# Patient Record
Sex: MALE | Race: ASIAN | NOT HISPANIC OR LATINO | Employment: UNEMPLOYED | URBAN - METROPOLITAN AREA
[De-identification: names, ages, dates, MRNs, and addresses within clinical notes are randomized per-mention and may not be internally consistent; named-entity substitution may affect disease eponyms.]

---

## 2024-07-25 ENCOUNTER — OFFICE VISIT (OUTPATIENT)
Dept: FAMILY MEDICINE CLINIC | Facility: CLINIC | Age: 24
End: 2024-07-25
Payer: COMMERCIAL

## 2024-07-25 VITALS
HEART RATE: 65 BPM | OXYGEN SATURATION: 98 % | SYSTOLIC BLOOD PRESSURE: 132 MMHG | BODY MASS INDEX: 26.68 KG/M2 | RESPIRATION RATE: 16 BRPM | HEIGHT: 67 IN | WEIGHT: 170 LBS | DIASTOLIC BLOOD PRESSURE: 84 MMHG | TEMPERATURE: 97.1 F

## 2024-07-25 DIAGNOSIS — E55.9 VITAMIN D DEFICIENCY: ICD-10-CM

## 2024-07-25 DIAGNOSIS — R53.82 CHRONIC FATIGUE: ICD-10-CM

## 2024-07-25 DIAGNOSIS — Z83.3 FAMILY HISTORY OF DIABETES MELLITUS: ICD-10-CM

## 2024-07-25 DIAGNOSIS — G43.009 MIGRAINE WITHOUT AURA AND WITHOUT STATUS MIGRAINOSUS, NOT INTRACTABLE: Primary | ICD-10-CM

## 2024-07-25 PROCEDURE — 1159F MED LIST DOCD IN RCRD: CPT | Performed by: FAMILY MEDICINE

## 2024-07-25 PROCEDURE — 99204 OFFICE O/P NEW MOD 45 MIN: CPT | Performed by: FAMILY MEDICINE

## 2024-07-25 PROCEDURE — 1160F RVW MEDS BY RX/DR IN RCRD: CPT | Performed by: FAMILY MEDICINE

## 2024-07-25 RX ORDER — TOPIRAMATE 50 MG/1
50 TABLET, FILM COATED ORAL 2 TIMES DAILY
Qty: 60 TABLET | Refills: 2 | Status: SHIPPED | OUTPATIENT
Start: 2024-07-25

## 2024-07-25 RX ORDER — SUMATRIPTAN 50 MG/1
TABLET, FILM COATED ORAL
Qty: 9 TABLET | Refills: 2 | Status: SHIPPED | OUTPATIENT
Start: 2024-07-25

## 2024-07-25 NOTE — PROGRESS NOTES
Subjective     Vi Loco is a 24 y.o. male.     Chief Complaint   Patient presents with    Establish Care    Headache    Fatigue       History of Present Illness     To establish care, discuss the followings ;    He is c/o recurrent HA mainly on the Lt side , ~ 2 x /week, worse with light and sounds .   He stated in 2019 had bad HA to the point he lost consciousness  Smokes occasionally   Feels fatigue and tired   Vit D defi; not on suppl  +ve Fhx for DM     CT brain showed;   IMPRESSION:   1. Accounting for differences in technique and slice selection, there has been no significant interval change compared to the outside facility MRI brain from 6/3/2019. There is no noncontrast MR evidence of an acute intracranial abnormality.   2. The scattered foci of T2/FLAIR hyperintensity in the supratentorial white matter are entirely non-specific, but are not significantly changed compared to the prior outside facility exam. The differential diagnosis is broad, and can include sequela of   chronic migraines, gliosis from nonspecific prior injury, advanced chronic small vessel ischemic changes if there is a history of uncontrolled diabetes or hypertension, demyelinating process, vasculitis, Lyme disease in the appropriate setting, or other   nonspecific infectious/inflammatory process.        Labs and documentation from previous PCP / consulting physician has been reviewed          The following portions of the patient's history were reviewed and updated as appropriate: allergies, current medications, past family history, past medical history, past social history, past surgical history, and problem list.        Review of Systems   Neurological:  Positive for headache.       Vitals:    07/25/24 1057   BP: 132/84   Pulse: 65   Resp: 16   Temp: 97.1 °F (36.2 °C)   SpO2: 98%           07/25/24  1057   Weight: 77.1 kg (170 lb)         Body mass index is 26.63 kg/m².      Current Outpatient Medications   Medication Sig  Dispense Refill    SUMAtriptan (Imitrex) 50 MG tablet Take one tablet at onset of headache. May repeat dose one time in 2 hours if headache not relieved. 9 tablet 2    topiramate (Topamax) 50 MG tablet Take 1 tablet by mouth 2 (Two) Times a Day. 60 tablet 2     No current facility-administered medications for this visit.                Objective   Physical Exam  Vitals and nursing note reviewed.   Constitutional:       General: He is not in acute distress.     Appearance: He is not ill-appearing, toxic-appearing or diaphoretic.   Eyes:      Extraocular Movements: Extraocular movements intact.      Conjunctiva/sclera: Conjunctivae normal.      Pupils: Pupils are equal, round, and reactive to light.   Neck:      Comments: No enlarged thyroid      Cardiovascular:      Rate and Rhythm: Normal rate and regular rhythm.      Heart sounds: Normal heart sounds. No murmur heard.  Pulmonary:      Effort: Pulmonary effort is normal. No respiratory distress.      Breath sounds: Normal breath sounds. No stridor. No wheezing or rhonchi.   Musculoskeletal:      Cervical back: Neck supple.   Skin:     General: Skin is warm.      Coloration: Skin is not jaundiced or pale.   Neurological:      General: No focal deficit present.      Mental Status: He is alert and oriented to person, place, and time.      Gait: Gait normal.   Psychiatric:         Mood and Affect: Mood normal.         Behavior: Behavior normal.         Thought Content: Thought content normal.           Assessment & Plan   Diagnoses and all orders for this visit:    1. Migraine without aura and without status migrainosus, not intractable (Primary)  Comments:  most likley his sx 2/2 Migraine HA  close monitoring and HA login  will start on ;  Orders:  -     SUMAtriptan (Imitrex) 50 MG tablet; Take one tablet at onset of headache. May repeat dose one time in 2 hours if headache not relieved.  Dispense: 9 tablet; Refill: 2  -     topiramate (Topamax) 50 MG tablet; Take 1  tablet by mouth 2 (Two) Times a Day.  Dispense: 60 tablet; Refill: 2    2. Chronic fatigue  -     CBC (No Diff)  -     Lipid Panel  -     Comprehensive Metabolic Panel  -     Hemoglobin A1c  -     Vitamin B12  -     TSH Rfx On Abnormal To Free T4    3. Family history of diabetes mellitus  -     Hemoglobin A1c    4. Vitamin D deficiency  -     Vitamin D,25-Hydroxy          I spent 48 minutes caring for this patient on this date of service. This time includes time spent by me in the following activities:preparing for the visit,reviewing previous medical records,  performing a medically appropriate examination and/or evaluation, counseling and educating the patient/family/caregiver, and documenting information in the medical record.     Patient was given instructions and counseling regarding her condition or for health maintenance advice. Please see specific information pulled into the AVS if appropriate.         I have fully discussed the nature of the medical condition(s) risks, complications, management, safe and proper use of medications.   Pt stated no allergy to the above prescribed medication.  I have discussed the SIDE EFFECT OF MEDICATION and importance TO report any side effect , the patient expressed good understanding.  Encouraged medication compliance and the importance of keeping scheduled follow up appointments with me and any other providers.    Patient instructed to follow up with our office for results on any labs/imaging ordered during this visit.    Home care discussed  All questions answered  Patient verbalizes understanding and agrees to treatment plan.     Follow up: Return in about 4 weeks (around 8/22/2024) for physical.      Addendum;  Labs has been ordered and personally/independently interpreted , discussed with pt     All ok except for low vit B12 AND VIT D insuff  Will start on weekly vit d   Advised for b12 shots vs oral b12   .  Lab Results   Component Value Date    GLUCOSE 84 07/25/2024     BUN 8 07/25/2024    CREATININE 0.79 07/25/2024    EGFRRESULT 128 07/25/2024    BCR 10 07/25/2024    K 4.2 07/25/2024    CO2 23 07/25/2024    CALCIUM 10.1 07/25/2024    PROTENTOTREF 7.7 07/25/2024    ALBUMIN 4.9 07/25/2024    BILITOT 0.5 07/25/2024    AST 16 07/25/2024    ALT 14 07/25/2024     Lab Results   Component Value Date    CHLPL 141 07/25/2024    TRIG 78 07/25/2024    HDL 47 07/25/2024    LDL 79 07/25/2024     Lab Results   Component Value Date    KGWOFZYL95 203 (L) 07/25/2024     Lab Results   Component Value Date    HGBA1C 5.0 07/25/2024     Lab Results   Component Value Date    TSH 2.360 07/25/2024     Lab Results   Component Value Date    WBC 5.4 07/25/2024    HGB 15.0 07/25/2024    HCT 44.0 07/25/2024    MCV 85 07/25/2024     07/25/2024

## 2024-07-26 LAB
25(OH)D3+25(OH)D2 SERPL-MCNC: 20.8 NG/ML (ref 30–100)
ALBUMIN SERPL-MCNC: 4.9 G/DL (ref 4.3–5.2)
ALP SERPL-CCNC: 79 IU/L (ref 44–121)
ALT SERPL-CCNC: 14 IU/L (ref 0–44)
AST SERPL-CCNC: 16 IU/L (ref 0–40)
BILIRUB SERPL-MCNC: 0.5 MG/DL (ref 0–1.2)
BUN SERPL-MCNC: 8 MG/DL (ref 6–20)
BUN/CREAT SERPL: 10 (ref 9–20)
CALCIUM SERPL-MCNC: 10.1 MG/DL (ref 8.7–10.2)
CHLORIDE SERPL-SCNC: 102 MMOL/L (ref 96–106)
CHOLEST SERPL-MCNC: 141 MG/DL (ref 100–199)
CO2 SERPL-SCNC: 23 MMOL/L (ref 20–29)
CREAT SERPL-MCNC: 0.79 MG/DL (ref 0.76–1.27)
EGFRCR SERPLBLD CKD-EPI 2021: 128 ML/MIN/1.73
ERYTHROCYTE [DISTWIDTH] IN BLOOD BY AUTOMATED COUNT: 13.1 % (ref 11.6–15.4)
GLOBULIN SER CALC-MCNC: 2.8 G/DL (ref 1.5–4.5)
GLUCOSE SERPL-MCNC: 84 MG/DL (ref 70–99)
HBA1C MFR BLD: 5 % (ref 4.8–5.6)
HCT VFR BLD AUTO: 44 % (ref 37.5–51)
HDLC SERPL-MCNC: 47 MG/DL
HGB BLD-MCNC: 15 G/DL (ref 13–17.7)
LDLC SERPL CALC-MCNC: 79 MG/DL (ref 0–99)
MCH RBC QN AUTO: 29 PG (ref 26.6–33)
MCHC RBC AUTO-ENTMCNC: 34.1 G/DL (ref 31.5–35.7)
MCV RBC AUTO: 85 FL (ref 79–97)
PLATELET # BLD AUTO: 318 X10E3/UL (ref 150–450)
POTASSIUM SERPL-SCNC: 4.2 MMOL/L (ref 3.5–5.2)
PROT SERPL-MCNC: 7.7 G/DL (ref 6–8.5)
RBC # BLD AUTO: 5.17 X10E6/UL (ref 4.14–5.8)
SODIUM SERPL-SCNC: 139 MMOL/L (ref 134–144)
TRIGL SERPL-MCNC: 78 MG/DL (ref 0–149)
TSH SERPL DL<=0.005 MIU/L-ACNC: 2.36 UIU/ML (ref 0.45–4.5)
VIT B12 SERPL-MCNC: 203 PG/ML (ref 232–1245)
VLDLC SERPL CALC-MCNC: 15 MG/DL (ref 5–40)
WBC # BLD AUTO: 5.4 X10E3/UL (ref 3.4–10.8)

## 2024-07-28 RX ORDER — ERGOCALCIFEROL 1.25 MG/1
50000 CAPSULE ORAL WEEKLY
Qty: 12 CAPSULE | Refills: 0 | Status: SHIPPED | OUTPATIENT
Start: 2024-07-28

## 2024-09-17 ENCOUNTER — OFFICE VISIT (OUTPATIENT)
Dept: FAMILY MEDICINE CLINIC | Facility: CLINIC | Age: 24
End: 2024-09-17
Payer: COMMERCIAL

## 2024-09-17 VITALS
SYSTOLIC BLOOD PRESSURE: 120 MMHG | BODY MASS INDEX: 26.84 KG/M2 | DIASTOLIC BLOOD PRESSURE: 78 MMHG | RESPIRATION RATE: 16 BRPM | TEMPERATURE: 97.7 F | HEART RATE: 57 BPM | WEIGHT: 171 LBS | OXYGEN SATURATION: 98 % | HEIGHT: 67 IN

## 2024-09-17 DIAGNOSIS — E53.8 VITAMIN B12 DEFICIENCY: ICD-10-CM

## 2024-09-17 DIAGNOSIS — E55.9 VITAMIN D DEFICIENCY: ICD-10-CM

## 2024-09-17 DIAGNOSIS — Z00.00 ENCOUNTER FOR ANNUAL PHYSICAL EXAM: Primary | ICD-10-CM

## 2024-09-17 DIAGNOSIS — G43.009 MIGRAINE WITHOUT AURA AND WITHOUT STATUS MIGRAINOSUS, NOT INTRACTABLE: ICD-10-CM

## 2024-09-17 PROCEDURE — 99395 PREV VISIT EST AGE 18-39: CPT | Performed by: FAMILY MEDICINE

## 2024-09-17 PROCEDURE — 2014F MENTAL STATUS ASSESS: CPT | Performed by: FAMILY MEDICINE

## 2024-09-17 PROCEDURE — 1159F MED LIST DOCD IN RCRD: CPT | Performed by: FAMILY MEDICINE

## 2024-09-17 PROCEDURE — 1160F RVW MEDS BY RX/DR IN RCRD: CPT | Performed by: FAMILY MEDICINE

## 2024-09-17 RX ORDER — LANOLIN ALCOHOL/MO/W.PET/CERES
1000 CREAM (GRAM) TOPICAL DAILY
Qty: 90 TABLET | Refills: 1 | Status: SHIPPED | OUTPATIENT
Start: 2024-09-17

## 2024-11-07 DIAGNOSIS — G43.009 MIGRAINE WITHOUT AURA AND WITHOUT STATUS MIGRAINOSUS, NOT INTRACTABLE: Primary | ICD-10-CM

## 2024-12-13 ENCOUNTER — TELEPHONE (OUTPATIENT)
Dept: NEUROLOGY | Facility: CLINIC | Age: 24
End: 2024-12-13
Payer: COMMERCIAL

## 2024-12-13 NOTE — TELEPHONE ENCOUNTER
Attempted to LVM in regard to provider being out of office. Informed on VM, [email or web address] message, and mail reminder on the next new appointment set for patient. Patient is scheduled for March 19th at 2:30PM

## 2025-03-21 ENCOUNTER — PATIENT ROUNDING (BHMG ONLY) (OUTPATIENT)
Dept: NEUROLOGY | Facility: CLINIC | Age: 25
End: 2025-03-21
Payer: COMMERCIAL

## 2025-05-13 ENCOUNTER — RESULTS FOLLOW-UP (OUTPATIENT)
Dept: NEUROLOGY | Facility: CLINIC | Age: 25
End: 2025-05-13

## 2025-05-13 ENCOUNTER — LAB (OUTPATIENT)
Dept: LAB | Facility: HOSPITAL | Age: 25
End: 2025-05-13
Payer: COMMERCIAL

## 2025-05-13 ENCOUNTER — OFFICE VISIT (OUTPATIENT)
Dept: NEUROLOGY | Facility: CLINIC | Age: 25
End: 2025-05-13
Payer: COMMERCIAL

## 2025-05-13 VITALS
SYSTOLIC BLOOD PRESSURE: 124 MMHG | OXYGEN SATURATION: 98 % | DIASTOLIC BLOOD PRESSURE: 82 MMHG | WEIGHT: 164 LBS | HEART RATE: 68 BPM | HEIGHT: 67 IN | BODY MASS INDEX: 25.74 KG/M2

## 2025-05-13 DIAGNOSIS — R41.3 MEMORY LOSS: ICD-10-CM

## 2025-05-13 DIAGNOSIS — E55.9 VITAMIN D DEFICIENCY: ICD-10-CM

## 2025-05-13 DIAGNOSIS — G43.009 MIGRAINE WITHOUT AURA AND WITHOUT STATUS MIGRAINOSUS, NOT INTRACTABLE: Primary | ICD-10-CM

## 2025-05-13 PROBLEM — R41.82 ALTERED MENTAL STATUS: Status: ACTIVE | Noted: 2025-05-13

## 2025-05-13 PROBLEM — R13.12 OROPHARYNGEAL DYSPHAGIA: Status: ACTIVE | Noted: 2025-05-13

## 2025-05-13 LAB
25(OH)D3 SERPL-MCNC: 15.5 NG/ML (ref 30–100)
FOLATE SERPL-MCNC: 6.67 NG/ML (ref 4.78–24.2)
TSH SERPL DL<=0.05 MIU/L-ACNC: 1.65 UIU/ML (ref 0.27–4.2)
VIT B12 BLD-MCNC: 224 PG/ML (ref 211–946)

## 2025-05-13 PROCEDURE — 84443 ASSAY THYROID STIM HORMONE: CPT | Performed by: NURSE PRACTITIONER

## 2025-05-13 PROCEDURE — 82746 ASSAY OF FOLIC ACID SERUM: CPT

## 2025-05-13 PROCEDURE — 82607 VITAMIN B-12: CPT

## 2025-05-13 PROCEDURE — 1159F MED LIST DOCD IN RCRD: CPT | Performed by: NURSE PRACTITIONER

## 2025-05-13 PROCEDURE — 36415 COLL VENOUS BLD VENIPUNCTURE: CPT

## 2025-05-13 PROCEDURE — 82306 VITAMIN D 25 HYDROXY: CPT

## 2025-05-13 PROCEDURE — 99204 OFFICE O/P NEW MOD 45 MIN: CPT | Performed by: NURSE PRACTITIONER

## 2025-05-13 PROCEDURE — 1160F RVW MEDS BY RX/DR IN RCRD: CPT | Performed by: NURSE PRACTITIONER

## 2025-05-13 NOTE — PROGRESS NOTES
[facility] NEUROLOGY         Date of Visit: 2025    Name: Vi Loco    :  2000    PCP: Jaspreet Navarro MD    Visit Type: an initial evaluation  Chief Complaint   Patient presents with    Headache     Intermittent- has tried topirmate and sumatriptan- reports severe eye pain with migraine has seen eye doctor all is good there.              Subjective     Patient ID: Vi is a 24 y.o. male.         History of Present Illness  I had the pleasure of seeing your patient for the first time today.  As you may know he is a 24-year-old male here today for initial evaluation for treatment of headaches.  He was referred by his primary care physician.    History:    Patient does have a history of previous migraine headaches previously seen by pediatric neurology after onset of headaches in approximately 2019.  Patient did end up having an MRI of the brain completed in 2019 which did show some microvascular changes typical for migrainous process with no other significant abnormalities.  He was supposed to be monitoring headache symptoms and following back up with neurology however headaches started to decrease in frequency and he did not continue with significant follow-up.    However patient has been continuing to have a little more frequency and headache symptoms.  He is here today to establish care and discuss medication management.  His primary care did end up prescribing him topiramate and sumatriptan for headache treatment.  He never started on the topiramate for an extended period of time.  He did try the sumatriptan which did decrease the headache but did not abort headache symptoms.  He is hesitant to take a daily medication at this time.    Patient is a  and has a [employer] license and needs to avoid medications that can cause drowsiness.    Current:    Patient states that headaches currently are a left-sided headache located behind his left eye.  He reports that is a  pressure or shooting like pain rated as a 5 or 6 out of 10 on the pain scale.  He denies sensitivity to light and sound however reports that he would like to sit in a dark room until the headache has resolved.  He states that majority of his headaches last less than 2 hours in duration however he does typically take Tylenol, ibuprofen for the headache which does seem to dull symptoms and allow headache to resolve better.  He denies any nausea, vomiting, vision changes, dizziness, lightheadedness, numbness or tingling.      Also reports some cognitive changes feeling that his brain is working slower he has more difficulty with word finding especially when he is trying to speak English which is not his native language.  He states that he also feels that he has word finding difficulty and general cognitive fogginess.  He does have a known history of vitamin B12 deficiency and was on oral B12 supplementation until about 3 months ago when he stopped the medication.  He has not had any follow-up for this or the vitamin D deficiency.  No other new neurologic complaints at today's visit.        The following portions of the patient's history were reviewed and updated as appropriate: allergies, current medications, past family history, past medical history, past social history, past surgical history, and problem list.                 Review of Systems   Constitutional:  Positive for fatigue. Negative for activity change, appetite change and unexpected weight change.   HENT:  Negative for hearing loss, tinnitus and trouble swallowing.    Eyes:  Negative for photophobia, pain and visual disturbance.   Respiratory:  Negative for chest tightness and shortness of breath.    Cardiovascular:  Negative for palpitations.   Gastrointestinal:  Negative for nausea and vomiting.   Musculoskeletal:  Negative for neck pain.   Neurological:  Positive for headaches. Negative for dizziness, syncope, facial asymmetry, speech difficulty,  "weakness, light-headedness and numbness.   Psychiatric/Behavioral:  Positive for confusion. Negative for sleep disturbance.             Current Medications:    Current Outpatient Medications   Medication Instructions    ubrogepant (UBRELVY) 100 mg, Oral, As Needed    vitamin B-12 (CYANOCOBALAMIN) 1,000 mcg, Oral, Daily    vitamin D (ERGOCALCIFEROL) 50,000 Units, Oral, Weekly          /82   Pulse 68   Ht 170.2 cm (67.01\")   Wt 74.4 kg (164 lb)   SpO2 98%   BMI 25.68 kg/m²                Objective     Neurological Exam  Mental Status  Awake, alert and oriented to person, place and time. Speech is normal. Language is fluent with no aphasia.    Cranial Nerves  CN II: Visual fields full to confrontation.  CN III, IV, VI: Extraocular movements intact bilaterally. Normal lids and orbits bilaterally. Pupils equal round and reactive to light bilaterally.  CN V: Facial sensation is normal.  CN VII: Full and symmetric facial movement.  CN IX, X: Palate elevates symmetrically  CN XI: Shoulder shrug strength is normal.  CN XII: Tongue midline without atrophy or fasciculations.    Motor  Normal muscle bulk throughout. Normal muscle tone. No abnormal involuntary movements. Strength is 5/5 throughout all four extremities.    Sensory  Sensation is intact to light touch, pinprick, vibration and proprioception in all four extremities.    Reflexes  Deep tendon reflexes are 2+ and symmetric in all four extremities.    Coordination    Finger-to-nose, rapid alternating movements and heel-to-shin normal bilaterally without dysmetria.    Gait  Normal casual, toe, heel and tandem gait.      Physical Exam  Constitutional:       Appearance: Normal appearance. He is normal weight.   HENT:      Head: Normocephalic.   Eyes:      General: Lids are normal.      Extraocular Movements: Extraocular movements intact.      Pupils: Pupils are equal, round, and reactive to light.   Pulmonary:      Effort: Pulmonary effort is normal. "   Musculoskeletal:         General: Normal range of motion.   Skin:     General: Skin is warm.   Neurological:      Motor: Motor strength is normal.     Coordination: Coordination is intact.      Deep Tendon Reflexes: Reflexes are normal and symmetric.   Psychiatric:         Mood and Affect: Mood normal.         Speech: Speech normal.         Behavior: Behavior normal.         Thought Content: Thought content normal.                     Assessment & Plan     Diagnoses and all orders for this visit:    1. Migraine without aura and without status migrainosus, not intractable (Primary)  -     ubrogepant (Ubrelvy) 100 MG tablet; Take 1 tablet by mouth As Needed (migraine) for up to 2 doses.  Dispense: 2 tablet; Refill: 0    2. Memory loss  -     Vitamin B12; Future  -     Folate; Future  -     Vitamin D 25 Hydroxy; Future  -     TSH Rfx On Abnormal To Free T4    3. Vitamin D deficiency  -     Vitamin D 25 Hydroxy; Future       At this time I would like to give patient Ubrelvy to try for abortive treatment as I do not think triptans would be successful in him due to because of drowsiness with the medications since he would not be able to take these and operate his vehicle.    I would also like to go ahead and check his vitamin B12 and vitamin D levels as these have been deficient in the past and could be contributing to some of his cognitive complaints.  We will also check thyroid today.    Follow-up in 2 months or sooner if needed.            Elisha BENSON    Neurology    [facility] [place]    Phone: [phone]    5/13/2025 , 16:34 EDT

## 2025-05-14 ENCOUNTER — CLINICAL SUPPORT (OUTPATIENT)
Dept: NEUROLOGY | Facility: CLINIC | Age: 25
End: 2025-05-14
Payer: COMMERCIAL

## 2025-05-14 DIAGNOSIS — E53.8 B12 DEFICIENCY: Primary | ICD-10-CM

## 2025-05-14 RX ORDER — ERGOCALCIFEROL 1.25 MG/1
50000 CAPSULE, LIQUID FILLED ORAL WEEKLY
Qty: 12 CAPSULE | Refills: 1 | Status: SHIPPED | OUTPATIENT
Start: 2025-05-14 | End: 2025-11-10

## 2025-05-14 RX ORDER — CYANOCOBALAMIN 1000 UG/ML
1000 INJECTION, SOLUTION INTRAMUSCULAR; SUBCUTANEOUS
Status: SHIPPED | OUTPATIENT
Start: 2025-05-14

## 2025-05-14 RX ADMIN — CYANOCOBALAMIN 1000 MCG: 1000 INJECTION, SOLUTION INTRAMUSCULAR; SUBCUTANEOUS at 13:15

## 2025-06-24 ENCOUNTER — OFFICE VISIT (OUTPATIENT)
Dept: FAMILY MEDICINE CLINIC | Facility: CLINIC | Age: 25
End: 2025-06-24
Payer: COMMERCIAL

## 2025-06-24 ENCOUNTER — CLINICAL SUPPORT (OUTPATIENT)
Dept: NEUROLOGY | Facility: CLINIC | Age: 25
End: 2025-06-24
Payer: COMMERCIAL

## 2025-06-24 VITALS
OXYGEN SATURATION: 98 % | DIASTOLIC BLOOD PRESSURE: 80 MMHG | HEART RATE: 62 BPM | SYSTOLIC BLOOD PRESSURE: 120 MMHG | RESPIRATION RATE: 16 BRPM | WEIGHT: 166 LBS | BODY MASS INDEX: 26.06 KG/M2 | TEMPERATURE: 98.6 F | HEIGHT: 67 IN

## 2025-06-24 DIAGNOSIS — G89.29 CHRONIC HIP PAIN, LEFT: ICD-10-CM

## 2025-06-24 DIAGNOSIS — R53.82 CHRONIC FATIGUE: ICD-10-CM

## 2025-06-24 DIAGNOSIS — M25.552 CHRONIC HIP PAIN, LEFT: ICD-10-CM

## 2025-06-24 DIAGNOSIS — E53.8 VITAMIN B12 DEFICIENCY: ICD-10-CM

## 2025-06-24 DIAGNOSIS — E55.9 VITAMIN D DEFICIENCY: ICD-10-CM

## 2025-06-24 DIAGNOSIS — R41.3 MEMORY LOSS: ICD-10-CM

## 2025-06-24 DIAGNOSIS — E53.8 B12 DEFICIENCY: Primary | ICD-10-CM

## 2025-06-24 DIAGNOSIS — G43.009 MIGRAINE WITHOUT AURA AND WITHOUT STATUS MIGRAINOSUS, NOT INTRACTABLE: Primary | ICD-10-CM

## 2025-06-24 PROCEDURE — 96372 THER/PROPH/DIAG INJ SC/IM: CPT | Performed by: NURSE PRACTITIONER

## 2025-06-24 PROCEDURE — 99214 OFFICE O/P EST MOD 30 MIN: CPT | Performed by: FAMILY MEDICINE

## 2025-06-24 RX ORDER — PREDNISONE 20 MG/1
40 TABLET ORAL DAILY
Qty: 10 TABLET | Refills: 0 | Status: SHIPPED | OUTPATIENT
Start: 2025-06-24 | End: 2025-06-29

## 2025-06-24 RX ORDER — MELOXICAM 15 MG/1
15 TABLET ORAL DAILY
Qty: 30 TABLET | Refills: 0 | Status: SHIPPED | OUTPATIENT
Start: 2025-06-24

## 2025-06-24 RX ORDER — CYANOCOBALAMIN 1000 UG/ML
1000 INJECTION, SOLUTION INTRAMUSCULAR; SUBCUTANEOUS
Status: SHIPPED | OUTPATIENT
Start: 2025-06-24

## 2025-06-24 RX ADMIN — CYANOCOBALAMIN 1000 MCG: 1000 INJECTION, SOLUTION INTRAMUSCULAR; SUBCUTANEOUS at 14:08

## 2025-06-24 NOTE — PROGRESS NOTES
Subjective     Vi Loco is a 24 y.o. male.     Chief Complaint   Patient presents with    Migraine    Back Pain     Left side x 2 weeks. Have got worst.     Numbness     Both foot swelling also.        History of Present Illness     He was  .    Presents for FU On the followings;     Feeling fatigue and tired for few months, with memory loss.Not depressed.   Labs done few weeks ago, showed low vit b12 and vit d    Migraine HA without aura   Follow with Neurology  Started on Ubrelvy , dose not like it, usually take Ibu PRN which helps     C/o Lt side hip pain and Lt side lower BP for 2 weeks, NKI,   Pain on/off, worse with long standing , sx worse lately     Lt Leg swelling; sx last for few days , then resolved, dose not eat salty diet     Memory loss; saw Neurol, labs ordered , showed low vit b12      Vit B12 defi; on vit b12 shot per protocol from Neurology +  daily MVT     Vit D defi; on weekly Vit D       Labs has been reviewed and discussed with pt       Vit D 15    Lab Results   Component Value Date    BGOKKDKV01 224 05/13/2025     Lab Results   Component Value Date    TSH 1.650 05/13/2025     Lab Results   Component Value Date    CHLPL 141 07/25/2024    TRIG 78 07/25/2024    HDL 47 07/25/2024    LDL 79 07/25/2024     Lab Results   Component Value Date    GLUCOSE 84 07/25/2024    BUN 8 07/25/2024    CREATININE 0.79 07/25/2024     07/25/2024    K 4.2 07/25/2024     07/25/2024    CALCIUM 10.1 07/25/2024    PROTEINTOT 7.7 07/25/2024    ALBUMIN 4.9 07/25/2024    ALT 14 07/25/2024    AST 16 07/25/2024    ALKPHOS 79 07/25/2024    BILITOT 0.5 07/25/2024    GLOB 2.8 07/25/2024    BCR 10 07/25/2024    EGFR 128 07/25/2024            The following portions of the patient's history were reviewed and updated as appropriate: allergies, current medications, past family history, past medical history, past social history, past surgical history, and problem list.        Review of Systems    Musculoskeletal:  Positive for arthralgias.   Neurological:  Positive for headache.       Vitals:    06/24/25 1318   BP: 120/80   Pulse: 62   Resp: 16   Temp: 98.6 °F (37 °C)   SpO2: 98%           06/24/25  1318   Weight: 75.3 kg (166 lb)         Body mass index is 25.99 kg/m².      Current Outpatient Medications   Medication Sig Dispense Refill    vitamin B-12 (CYANOCOBALAMIN) 1000 MCG tablet Take 1 tablet by mouth Daily. 90 tablet 1    vitamin D (ERGOCALCIFEROL) 1.25 MG (33230 UT) capsule capsule Take 1 capsule by mouth 1 (One) Time Per Week for 180 days. 12 capsule 1    meloxicam (Mobic) 15 MG tablet Take 1 tablet by mouth Daily. 30 tablet 0    predniSONE (DELTASONE) 20 MG tablet Take 2 tablets by mouth Daily for 5 days. 10 tablet 0     Current Facility-Administered Medications   Medication Dose Route Frequency Provider Last Rate Last Admin    cyanocobalamin injection 1,000 mcg  1,000 mcg Intramuscular Q28 Days Elisha Gonzales APRN   1,000 mcg at 05/14/25 1315    cyanocobalamin injection 1,000 mcg  1,000 mcg Intramuscular Q28 Days Elisha Gonzales APRN   1,000 mcg at 06/24/25 1408                Objective   Physical Exam  Vitals and nursing note reviewed.   Constitutional:       General: He is not in acute distress.     Appearance: He is not toxic-appearing.   Cardiovascular:      Rate and Rhythm: Normal rate and regular rhythm.      Heart sounds: Normal heart sounds. No murmur heard.  Pulmonary:      Effort: Pulmonary effort is normal. No respiratory distress.      Breath sounds: Normal breath sounds. No stridor. No wheezing or rhonchi.   Musculoskeletal:         General: No swelling, tenderness or deformity.   Neurological:      General: No focal deficit present.      Mental Status: He is alert and oriented to person, place, and time.   Psychiatric:         Mood and Affect: Mood normal.         Behavior: Behavior normal.         Thought Content: Thought content normal.           Assessment & Plan   Diagnoses  and all orders for this visit:    1. Migraine without aura and without status migrainosus, not intractable (Primary)  Comments:  ok with Ibu PRN    2. Vitamin D deficiency  Comments:  discussed diet   contineu weekly suppl    3. Vitamin B12 deficiency  Comments:  contineu b12 shots per protocol with neuro office    4. Chronic fatigue  Comments:  likley from vit defi    5. Memory loss  Comments:  as above    6. Chronic hip pain, left  Comments:  consider XR if no better  Orders:  -     meloxicam (Mobic) 15 MG tablet; Take 1 tablet by mouth Daily.  Dispense: 30 tablet; Refill: 0  -     predniSONE (DELTASONE) 20 MG tablet; Take 2 tablets by mouth Daily for 5 days.  Dispense: 10 tablet; Refill: 0          Patient was given instructions and counseling regarding her condition or for health maintenance advice. Please see specific information pulled into the AVS if appropriate.     I have fully discussed the nature of the medical condition(s) risks, complications, management, safe and proper use of medications.   Pt stated no allergy to the above prescribed medication.  I have discussed the SIDE EFFECT OF MEDICATION and importance TO report any side effect , the patient expressed good understanding.  Encouraged medication compliance and the importance of keeping scheduled follow up appointments with me and any other providers.    Patient instructed to follow up with our office for results on any labs/imaging ordered during this visit.    Home care discussed  All questions answered  Patient verbalizes understanding and agrees to treatment plan.     Follow up: Return in about 6 weeks (around 8/5/2025) for follow up on current illness.

## 2025-07-01 ENCOUNTER — CLINICAL SUPPORT (OUTPATIENT)
Dept: NEUROLOGY | Facility: CLINIC | Age: 25
End: 2025-07-01
Payer: COMMERCIAL

## 2025-07-01 DIAGNOSIS — E53.8 B12 DEFICIENCY: Primary | ICD-10-CM

## 2025-07-01 RX ORDER — CYANOCOBALAMIN 1000 UG/ML
1000 INJECTION, SOLUTION INTRAMUSCULAR; SUBCUTANEOUS
Status: SHIPPED | OUTPATIENT
Start: 2025-07-01

## 2025-07-01 RX ADMIN — CYANOCOBALAMIN 1000 MCG: 1000 INJECTION, SOLUTION INTRAMUSCULAR; SUBCUTANEOUS at 13:46

## 2025-07-02 DIAGNOSIS — M25.552 HIP PAIN, CHRONIC, LEFT: Primary | ICD-10-CM

## 2025-07-02 DIAGNOSIS — G89.29 HIP PAIN, CHRONIC, LEFT: Primary | ICD-10-CM

## 2025-07-07 ENCOUNTER — CLINICAL SUPPORT (OUTPATIENT)
Dept: NEUROLOGY | Facility: CLINIC | Age: 25
End: 2025-07-07
Payer: COMMERCIAL

## 2025-07-07 DIAGNOSIS — E53.8 B12 DEFICIENCY: Primary | ICD-10-CM

## 2025-07-07 DIAGNOSIS — M25.552 HIP PAIN, CHRONIC, LEFT: Primary | ICD-10-CM

## 2025-07-07 DIAGNOSIS — G89.29 HIP PAIN, CHRONIC, LEFT: Primary | ICD-10-CM

## 2025-07-07 PROCEDURE — 96372 THER/PROPH/DIAG INJ SC/IM: CPT | Performed by: NURSE PRACTITIONER

## 2025-07-07 RX ORDER — CYANOCOBALAMIN 1000 UG/ML
1000 INJECTION, SOLUTION INTRAMUSCULAR; SUBCUTANEOUS
Status: SHIPPED | OUTPATIENT
Start: 2025-07-07

## 2025-07-07 RX ADMIN — CYANOCOBALAMIN 1000 MCG: 1000 INJECTION, SOLUTION INTRAMUSCULAR; SUBCUTANEOUS at 13:34

## 2025-07-22 ENCOUNTER — CLINICAL SUPPORT (OUTPATIENT)
Dept: NEUROLOGY | Facility: CLINIC | Age: 25
End: 2025-07-22
Payer: COMMERCIAL

## 2025-07-22 DIAGNOSIS — E53.8 B12 DEFICIENCY: Primary | ICD-10-CM

## 2025-07-22 RX ORDER — CYANOCOBALAMIN 1000 UG/ML
1000 INJECTION, SOLUTION INTRAMUSCULAR; SUBCUTANEOUS
Status: SHIPPED | OUTPATIENT
Start: 2025-07-22

## 2025-07-22 RX ADMIN — CYANOCOBALAMIN 1000 MCG: 1000 INJECTION, SOLUTION INTRAMUSCULAR; SUBCUTANEOUS at 13:22
